# Patient Record
Sex: FEMALE | Race: WHITE | URBAN - METROPOLITAN AREA
[De-identification: names, ages, dates, MRNs, and addresses within clinical notes are randomized per-mention and may not be internally consistent; named-entity substitution may affect disease eponyms.]

---

## 2017-04-19 ENCOUNTER — IP HISTORICAL/CONVERTED ENCOUNTER (OUTPATIENT)
Dept: OTHER | Age: 62
End: 2017-04-19

## 2019-03-06 ENCOUNTER — OP HISTORICAL/CONVERTED ENCOUNTER (OUTPATIENT)
Dept: OTHER | Age: 64
End: 2019-03-06

## 2019-04-01 ENCOUNTER — OP HISTORICAL/CONVERTED ENCOUNTER (OUTPATIENT)
Dept: OTHER | Age: 64
End: 2019-04-01

## 2019-05-01 ENCOUNTER — OP HISTORICAL/CONVERTED ENCOUNTER (OUTPATIENT)
Dept: OTHER | Age: 64
End: 2019-05-01

## 2019-05-09 ENCOUNTER — OFFICE VISIT (OUTPATIENT)
Dept: ENDOCRINOLOGY | Age: 64
End: 2019-05-09

## 2019-05-09 VITALS
DIASTOLIC BLOOD PRESSURE: 72 MMHG | OXYGEN SATURATION: 95 % | TEMPERATURE: 98.1 F | WEIGHT: 141.7 LBS | HEART RATE: 77 BPM | RESPIRATION RATE: 14 BRPM | BODY MASS INDEX: 25.11 KG/M2 | HEIGHT: 63 IN | SYSTOLIC BLOOD PRESSURE: 138 MMHG

## 2019-05-09 DIAGNOSIS — E03.8 SUBCLINICAL HYPOTHYROIDISM: Primary | ICD-10-CM

## 2019-05-09 DIAGNOSIS — E06.3 HASHIMOTO'S DISEASE: ICD-10-CM

## 2019-05-09 RX ORDER — BISMUTH SUBSALICYLATE 262 MG
1 TABLET,CHEWABLE ORAL DAILY
COMMUNITY

## 2019-05-09 RX ORDER — VITAMIN E 268 MG
CAPSULE ORAL DAILY
COMMUNITY
End: 2020-05-11

## 2019-05-09 RX ORDER — ALPRAZOLAM 0.5 MG/1
TABLET ORAL
Refills: 2 | COMMUNITY
Start: 2019-04-16

## 2019-05-09 RX ORDER — ASCORBIC ACID 500 MG
TABLET ORAL
COMMUNITY

## 2019-05-09 RX ORDER — SOLIFENACIN SUCCINATE 5 MG/1
TABLET, FILM COATED ORAL
Refills: 6 | COMMUNITY
Start: 2019-03-19 | End: 2020-05-11

## 2019-05-09 RX ORDER — BUSPIRONE HYDROCHLORIDE 10 MG/1
10 TABLET ORAL
COMMUNITY
End: 2020-05-11

## 2019-05-09 RX ORDER — LISINOPRIL 5 MG/1
1 TABLET ORAL DAILY
COMMUNITY
Start: 2019-05-04

## 2019-05-09 RX ORDER — VENLAFAXINE HYDROCHLORIDE 37.5 MG/1
37.5 CAPSULE, EXTENDED RELEASE ORAL DAILY
COMMUNITY

## 2019-05-09 RX ORDER — ASPIRIN 81 MG/1
TABLET ORAL
Refills: 0 | COMMUNITY
Start: 2019-05-03 | End: 2020-05-11

## 2019-05-09 RX ORDER — CHOLECALCIFEROL (VITAMIN D3) 125 MCG
2000 CAPSULE ORAL DAILY
COMMUNITY

## 2019-05-09 NOTE — PROGRESS NOTES
Jaida Good is a 58 y.o. female here for Chief Complaint Patient presents with  New Patient  
  referred by Dr. Leidy Chicas for Thyroid 1. Have you been to the ER, urgent care clinic since your last visit? Hospitalized since your last visit? -n/a 2. Have you seen or consulted any other health care providers outside of the [address] [place] [address] since your last visit?   Include any pap smears or colon screening.-n/a

## 2019-05-09 NOTE — LETTER
5/11/19 Patient: Jonna Romero YOB: 1955 Date of Visit: 5/9/2019 Chester Foy MD 
[address] [place] [address]. [address] [facility] [address] VIA Dear Chester Foy MD, Thank you for referring Ms. Jonna Romero to [id number] [place] [address] for evaluation. My notes for this consultation are attached. If you have questions, please do not hesitate to call me. I look forward to following your patient along with you. Sincerely, Makenzie Pearl MD

## 2019-05-09 NOTE — PROGRESS NOTES
[facility] DIABETES AND ENDOCRINOLOGY Sylwia Meng MD 
 
    [address] [phone] Fax [phone] Patient Information Date:5/11/2019 Name : Ila Orozco 58 y.o.    
YOB: 1955 Referred by: Ellen Shane MD  
 
 
 
History of present illness Ila Orozco is a 58 y.o. female  here for evaluation of thyroid. She was found to have elevated TPO antibody, TSH was 6. In 2018 TSH was normal.  She has underlying rheumatoid arthritis and lumbar stenosis, limited activity. She has been gaining weight gain, fatigue, no severe constipation, dry skin. No change in the size of the neck. No dysphagia,dysphonia or dyspnea. No cold intolerance No history of known radiation exposure FH of thyroid disease. No FH of thyroid cancer Wt Readings from Last 3 Encounters:  
05/09/19 141 lb 11.2 oz (64.3 kg) Past Medical History:  
Diagnosis Date  Rheumatoid arthritis ([facility] [address]  Spinal stenosis  Thyroiditis, chronic, lymphocytic Current Outpatient Medications Medication Sig  ALPRAZolam (XANAX) 0.5 mg tablet TAKE 1 TABLET BY MOUTH 3 TIMES A DAY AS NEEDED FOR ANXIETY  aspirin delayed-release 81 mg tablet TAKE 1 TABLET BY MOUTH DAILY  lisinopril (PRINIVIL, ZESTRIL) 5 mg tablet Take 1 Tab by mouth daily.  VESICARE 5 mg tablet TAKE 1 TABLET BY MOUTH EVERY DAY  busPIRone (BUSPAR) 10 mg tablet Take 10 mg by mouth.  cholecalciferol, vitamin D3, (VITAMIN D3) 2,000 unit tab Take 2,000 Units by mouth daily.  multivitamin (ONE A DAY) tablet Take 1 Tab by mouth daily.  venlafaxine-SR (EFFEXOR-XR) 37.5 mg capsule Take 37.5 mg by mouth daily.  ascorbic acid, vitamin C, (VITAMIN C) 500 mg tablet Take  by mouth.  vitamin E (AQUA GEMS) 400 unit capsule Take  by mouth daily.  OTHER Vitamin A No current facility-administered medications for this visit. No Known Allergies Review of Systems:  All 10 systems  reviewed and are negative other than mentioned in HPI Physical Examination: 
Blood pressure 138/72, pulse 77, temperature 98.1 °F (36.7 °C), temperature source Oral, resp. rate 14, height 5' 3\" (1.6 m), weight 141 lb 11.2 oz (64.3 kg), SpO2 95 %. - General: pleasant, no distress, good eye contact 
- HEENT: no exopthalmos, no periorbital edema, no scleral/conjunctival injection, EOMI, no lid lag or stare 
- Neck: supple, no thyromegaly, no nodules,no lymphadenopathy - Cardiovascular: regular, normal rate, normal S1 and S2, 
- Respiratory: clear to auscultation bilaterally - Gastrointestinal: soft, nontender, nondistended, BS + 
- Musculoskeletal: no proximal muscle weakness in upper or lower extremities - Integumentary: no tremors, no edema, 
- Neurological:alert and oriented - Psychiatric: normal mood and affect Data Reviewed:  
 
[] Reviewed labs Assessment/Plan: 1. Subclinical hypothyroidism 2. Hashimoto's disease Subclinical hypothyroidism Hashimoto's thyroiditis She has Hashimoto's thyroiditis based on the TPO positivity, TSH is minimally off, the symptoms (fatigue, weight gain) she has is not specific to hypothyroidism, discussed that it could be seen in other conditions. There is  strong evidence of benefits in patients who are treated when TSH is close to 10. We will repeat thyroid function tests, if normal she needs monitoring yearly as she is at risk for developing hypothyroidism. Clinically no goiter There are no Patient Instructions on file for this visit. Follow-up and Dispositions · Return in about 1 year (around 5/9/2020). Patient /caregiver verbalized understanding Voice-recognition software was used to generate this report, which may result in some phonetic-based errors in the grammar and contents.   Even though attempts were made to correct all the mistakes, some may have been missed and remained in the body of the report.

## 2019-05-10 LAB
T4 FREE SERPL-MCNC: 1.13 NG/DL (ref 0.82–1.77)
TSH SERPL DL<=0.005 MIU/L-ACNC: 3.48 UIU/ML (ref 0.45–4.5)

## 2019-06-25 ENCOUNTER — OP HISTORICAL/CONVERTED ENCOUNTER (OUTPATIENT)
Dept: OTHER | Age: 64
End: 2019-06-25

## 2020-05-11 ENCOUNTER — VIRTUAL VISIT (OUTPATIENT)
Dept: ENDOCRINOLOGY | Age: 65
End: 2020-05-11

## 2020-05-11 DIAGNOSIS — R32 URINARY INCONTINENCE IN FEMALE: ICD-10-CM

## 2020-05-11 DIAGNOSIS — E06.3 HASHIMOTO'S DISEASE: Primary | ICD-10-CM

## 2020-05-11 DIAGNOSIS — R79.89 LOW TESTOSTERONE: ICD-10-CM

## 2020-05-11 NOTE — PROGRESS NOTES
Ale Mane is a 71 y.o. female here for   Chief Complaint   Patient presents with    Thyroid Problem       1. Have you been to the ER, urgent care clinic since your last visit? Hospitalized since your last visit? -no    2. Have you seen or consulted any other health care providers outside of the [address] [place] [address] since your last visit?   Include any pap smears or colon screening.-Dr Cruz  Order placed for pt,  per Verbal Order with read back from Dr Renea Sherman 5/11/2020

## 2020-05-11 NOTE — PROGRESS NOTES
Ahmet Arnold MD                Patient Information  Date:5/12/2020  Name : Phil Rodriguez 71 y.o.     YOB: 1955         Referred by: Ana Ferris MD     Pursuant to the emergency declaration under the [facility], [facility], [facility] waiver authority and the [employer] Act, this Virtual  Visit was conducted, with patient's consent, to reduce the patient's risk of exposure to COVID-19 . Patient  is aware that this is a billable encounter and is responsible for copays/deductibles       Services were provided through a video synchronous discussion virtually to substitute for in-person clinic visit. Place of service: Provider : Office  Patient: Home    History of present illness    Phil Rodriguez is a 71 y.o. female  here for follow-up of thyroid. She was found to have elevated TPO antibody, TSH was 6 in 2019. In 2018 TSH was normal.  She has underlying osteoarthritis and cervical  stenosis, limited activity. No change in the size of the neck  She had labs by urologist in April 2020 which was done as part of work-up for urinary incontinence, TPO was in 300s, TSH normal free T4 normal  Estrogen, FSH was suggestive of menopause, low testosterone    She feels fine, no fatigue, weight gain  No dysphagia,dysphonia or dyspnea. This visit reports that she does not have rheumatoid arthritis but has osteoarthritis, arthralgia  No known gluten sensitivity    No history of known radiation exposure   FH of thyroid disease. No FH of thyroid cancer     Wt Readings from Last 3 Encounters:   05/09/19 141 lb 11.2 oz (64.3 kg)       Past Medical History:   Diagnosis Date    Spinal stenosis     Thyroiditis, chronic, lymphocytic        Current Outpatient Medications   Medication Sig    mirabegron ER (Myrbetriq) 50 mg ER tablet Take 50 mg by mouth daily.     ALPRAZolam (XANAX) 0.5 mg tablet TAKE 1 TABLET BY MOUTH 3 TIMES A DAY AS NEEDED FOR ANXIETY    lisinopril (PRINIVIL, ZESTRIL) 5 mg tablet Take 1 Tab by mouth daily.  cholecalciferol, vitamin D3, (VITAMIN D3) 2,000 unit tab Take 2,000 Units by mouth daily.  multivitamin (ONE A DAY) tablet Take 1 Tab by mouth daily.  venlafaxine-SR (EFFEXOR-XR) 37.5 mg capsule Take 37.5 mg by mouth daily.  ascorbic acid, vitamin C, (VITAMIN C) 500 mg tablet Take  by mouth. No current facility-administered medications for this visit. No Known Allergies      Review of Systems: Per HPI    Physical Examination:  There were no vitals taken for this visit. - General: pleasant, no distress, good eye contact  - HEENT: no exophthalmos, no periorbital edema, EOMI  - Neck: No visible thyromegaly  - RS: Normal respiratory effort  - Musculoskeletal: no tremors  - Neurological: alert and oriented  - Psychiatric: normal mood and affect  - Skin: Normal color    Data Reviewed:     [] Reviewed labs      Assessment/Plan:     1. Hashimoto's disease        Hashimoto's thyroiditis  Urinary incontinence: On Myrbetriq  Postmenopausal  Low testosterone    She has Hashimoto's thyroiditis based on the TPO positivity, TSH is normal  She needs monitoring yearly as she is at risk for developing hypothyroidism. Clinically no goiter  Discussed natural course of autoimmune thyroid disease, lifestyle changes, importance of the diet and exercise    Postmenopausal:    Low testosterone: There is no strong evidence to support her on replacement in females which was discussed with the patient      Urinary incontinence        There are no Patient Instructions on file for this visit. Follow-up and Dispositions    · Return in about 1 year (around 5/11/2021) for labs before next visit and follow up.              Patient /caregiver verbalized understanding   Voice-recognition software was used to generate this report, which may result in some phonetic-based errors in the grammar and contents. Even though attempts were made to correct all the mistakes, some may have been missed and remained in the body of the report.

## 2020-08-12 ENCOUNTER — OP HISTORICAL/CONVERTED ENCOUNTER (OUTPATIENT)
Dept: OTHER | Age: 65
End: 2020-08-12

## 2023-05-20 RX ORDER — VENLAFAXINE HYDROCHLORIDE 37.5 MG/1
37.5 CAPSULE, EXTENDED RELEASE ORAL DAILY
COMMUNITY

## 2023-05-20 RX ORDER — ASCORBIC ACID 500 MG
TABLET ORAL
COMMUNITY

## 2023-05-20 RX ORDER — ALPRAZOLAM 0.5 MG/1
TABLET ORAL
COMMUNITY
Start: 2019-04-16

## 2023-05-20 RX ORDER — LISINOPRIL 5 MG/1
1 TABLET ORAL DAILY
COMMUNITY
Start: 2019-05-04